# Patient Record
Sex: FEMALE | Race: ASIAN | NOT HISPANIC OR LATINO | Employment: UNEMPLOYED | URBAN - METROPOLITAN AREA
[De-identification: names, ages, dates, MRNs, and addresses within clinical notes are randomized per-mention and may not be internally consistent; named-entity substitution may affect disease eponyms.]

---

## 2017-10-27 ENCOUNTER — TRANSCRIBE ORDERS (OUTPATIENT)
Dept: ADMINISTRATIVE | Facility: HOSPITAL | Age: 52
End: 2017-10-27

## 2017-10-27 DIAGNOSIS — Z12.31 VISIT FOR SCREENING MAMMOGRAM: Primary | ICD-10-CM

## 2017-11-17 ENCOUNTER — ALLSCRIPTS OFFICE VISIT (OUTPATIENT)
Dept: OTHER | Facility: OTHER | Age: 52
End: 2017-11-17

## 2017-11-17 DIAGNOSIS — Z12.31 ENCOUNTER FOR SCREENING MAMMOGRAM FOR MALIGNANT NEOPLASM OF BREAST: ICD-10-CM

## 2017-11-18 NOTE — PROGRESS NOTES
Assessment    1  Encounter for gynecological examination with abnormal finding () (Z01 411)   2  Cervical cancer screening (V76 2) (Z12 4)   3  Colon cancer screening ( 51) (Z12 11)   4  Dietary calcium deficiency (269 3) (E58)   5  Visit for screening mammogram ( 12) (Z12 31)    Plan  Cervical cancer screening    · (Q) THINPREP TIS PAP AND HPV mRNA E6/E7 REFLEX HPV 16,18/45; Status:Active; Requested for:2017;    Perform:[facility]; Order Comments:53 yo   no hx abnl papcvx/endocvx; Due:2018; Ordered; For:Cervical cancer screening; Ordered By:Darby Bansal;  Colon cancer screening    · (Q) FECAL GLOBIN BY IMMUNOCHEMISTRY; Status:Active; Requested for:2017;    Perform:[facility]; Due:2018; Ordered;cancer screening; Ordered By:Maria Del Rosario Bansal;  Encounter for gynecological examination with abnormal finding    · Follow-up visit in 1 year Evaluation and Treatment  Follow-up  Status: Hold For -Scheduling  Requested for: 28YZL0502   Ordered;Encounter for gynecological examination with abnormal finding; Ordered By: Per Paulino Performed:  Due: 83VEF0352   · Begin or continue regular aerobic exercise  Gradually work up to at least {count1}sessions of {dur1} of exercise a week ; Status:Complete;   Done: 65QXG9162   Ordered;For:Encounter for gynecological examination with abnormal finding; Ordered By:Maria Del Rosario Bansal;   · Drink plenty of fluids ; Status:Complete;   Done: 41OQM4899   Ordered;For:Encounter for gynecological examination with abnormal finding; Ordered By:Maria Del Rosario Bansal;   · Eat a low fat and low cholesterol diet ; Status:Complete;   Done: 47IKV9243   Ordered;For:Encounter for gynecological examination with abnormal finding; Ordered By:Maria Del Rosario Bansal;   · Eat a normal well-balanced diet ; Status:Complete;   Done: 07CPS8360   Ordered;For:Encounter for gynecological examination with abnormal finding; Ordered By:Darby Bansal;   · Eat foods that are high in calcium  ; Status:Complete;   Done: 91EYX4092   Ordered;For:Encounter for gynecological examination with abnormal finding; Ordered By:Destinee Bansal;   · How to prevent vaginal infections ; Status:Complete;   Done: 97MJA3380   Ordered;for gynecological examination with abnormal finding; Ordered By:Darby Bansal;   · Many types of infections can be passed person to person  To prevent this you need to beisolated for 7 days ; Status:Complete;   Done: 06NTL8703   Ordered;For:Encounter for gynecological examination with abnormal finding; Ordered By:Destinee Bansal;   · Regular aerobic exercise can help reduce stress ; Status:Complete;   Done: 24CYC5648   Ordered;for gynecological examination with abnormal finding; Ordered By:Destinee Bansal;   · Stretch and warm up your muscles during the first 10 minutes , then cool down yourmuscles for the last 10 minutes of exercise ; Status:Complete;   Done: 94RKE6697   Ordered;for gynecological examination with abnormal finding; Ordered By:Destinee Bansal;   · These are things you can do to prevent falls in and around the home ; Status:Complete;  Done: 35PAM4579   Ordered;for gynecological examination with abnormal finding; Ordered By:Destinee Bansal;   · Use a sun block product with an SPF of 15 or more ; Status:Complete;   Done:17Zfn6723   Ordered;for gynecological examination with abnormal finding; Ordered By:Darby Bansal;   · We encourage all of our patients to exercise regularly  30 minutes of exercise or physicalactivity five or more days a week is recommended for children and adults  ;Status:Complete;   Done: 89RYT2836   Ordered;for gynecological examination with abnormal finding; Ordered By:Destinee Bansal;   · We recommend that you change your eating habits slowly ; Status:Complete;   Done:17Nov2017   Ordered;for gynecological examination with abnormal finding; Ordered By:Destinee Bansal;   · We recommend that you create an advance directive ; Status:Complete; Done:2017   Ordered;for gynecological examination with abnormal finding; Ordered By:Darby Bansal;   · We recommend that you follow these steps to lower your risk of osteoporosis  ;Status:Complete;   Done: 73WZZ7291   Ordered;for gynecological examination with abnormal finding; Ordered By:Darby Bansal;   · We recommend you modify your diet to achieve and maintain a healthy weight  Saundra Shock may increase your risk for developing health problems such as diabetes,heart disease, and cancer  Avoid high fat foods and eat a balanced diet richin fruits and vegetables  The combination of a reduced-calorie diet and increasedphysical activity is recommended  Please let us know if you would like tolearn more about your nutrition and calorie needs, and additional options includingweight loss programs that can help you achieve your goals ; Status:Complete;   Done:2017   Ordered;for gynecological examination with abnormal finding; Ordered By:Kellie Bansal;  Visit for screening mammogram    · MAMMO SCREENING BILATERAL W 3D & CAD; Status:Hold For - Scheduling; Requestedfor:2017;    Perform:[facility]; Order Comments:53 yo  needs screening; Due:2018; Ordered;for screening mammogram; Ordered By:Darby Bansal;    Discussion/Summary  Currently, she eats an adequate diet and has an adequate exercise regimen  Pap test was done today next cervical cancer screening is due Breast cancer screening: mammogram has been ordered  Colorectal cancer screening: fecal occult blood testing supplies were given, the next colonoscopy is due now and never  Osteoporosis screening: bone mineral density testing is not indicated  Advice and education were given regarding aerobic exercise, weight bearing exercise, calcium supplements, reproductive health and contraception  Patient discussion: discussed with the patient  Ca in Vegan diet sheets given  Encouraged colonoscopic screening        History of Present Illness  HPI: 44 yo  c menopausal since 4/15 presents for preventive caresame does feel safe in relationship1-2x/dywalks a lot with work (2H/dy)nonenone      Review of Systems  no pelvic pain,-- no postmenopausal bleeding-- and-- no dysuria  OB History  Pregnancy History (Brief):  Prior pregnancies: : 2  Para: 2 (full-term)-- and-- 2 (living)  Delivery type: 2 vaginal       Active Problems  1  Cervical cancer screening (V76 2) (Z12 4)   2  Colon cancer screening (V76 51) (Z12 11)   3  Dietary calcium deficiency (269 3) (E58)   4  Encounter for gynecological examination with abnormal finding (V72 31) (Z01 411)   5  Hypertension (401 9) (I10)   6  Visit for screening mammogram (V76 12) (Z12 31)    Past Medical History   · History of Chicken pox (052 9) (B01 9)   · History of Dysmenorrhea (625 3) (N94 6)   · Denied: History of abnormal cervical Pap smear   · History of pregnancy (V13 29)    The active problems and past medical history were reviewed and updated today  Surgical History   · History of Neck Surgery    The surgical history was reviewed and updated today  Family History  Mother    · Family history of Hypercholesteremia  Father    · Family history of Parkinson's disease    Social History     · Exercise: Walking   · Housewife or homemaker   ·    · Never a smoker   · No alcohol use   · No drug use   · [facility]  The social history was reviewed and updated today  Current Meds   1  Fish Oil 1200 MG Oral Capsule; Take 1 capsule twice daily; Therapy: 71GKP3435 to Recorded   2  Lisinopril 20 MG Oral Tablet; Therapy: (Recorded:92Pve0198) to Recorded   3  Vitamin D 2000 UNIT Oral Capsule; take 1 capsule daily; Therapy: 58PKU2175 to Recorded    Allergies  1   No Known Drug Allergies    Vitals   Recorded: 44QRI8615 99:54EQ   Systolic 564   Diastolic 60   Height 5 ft    Weight 100 lb 0 6 oz   BMI Calculated 19 54   BSA Calculated 1 39   LMP        Physical Exam Constitutional  General appearance: No acute distress, well appearing and well nourished  Neck  Neck: Normal, supple, trachea midline, no masses  Thyroid: Normal, no thyromegaly  Pulmonary  Respiratory effort: No increased work of breathing or signs of respiratory distress  Genitourinary  External genitalia: Normal and no lesions appreciated  Vagina: Normal, no lesions or dryness appreciated  -- pale pink  Urethral meatus: Normal    Bladder: Normal, soft, non-tender and no prolapse or masses appreciated  Cervix: Normal, no palpable masses  -- parous s lesions, firm, slightly elongated  Uterus: Normal, non-tender, not enlarged, and no palpable masses  -- RF, NSSC, NT,mobile  Adnexa/parametria: Normal, non-tender and no fullness or masses appreciated  -- no masses or tenderness  Anus, perineum, and rectum: Normal sphincter tone, no masses, and no prolapse  No masses or nodularity  Chest  Breasts: Normal and no dimpling or skin changes noted  Abdomen  Abdomen: Normal, non-tender, and no organomegaly noted  Liver and spleen: No hepatomegaly or splenomegaly  Examination for hernias: No hernias appreciated  Lymphatic  Palpation of lymph nodes in neck, axillae, groin and/or other locations: No lymphadenopathy or masses noted  Skin  Skin and subcutaneous tissue: Normal skin turgor and no rashes     Psychiatric  Orientation to person, place, and time: Normal    Mood and affect: Normal        Signatures   Electronically signed by : ASTON Costa ; Nov 17 2017  7:24AM EST                       (Author)

## 2017-11-22 LAB
ADDITIONAL INFORMATION (HISTORICAL): NORMAL
ADEQUACY: (HISTORICAL): NORMAL
COMMENT (HISTORICAL): NORMAL
CYTOTECHNOLOGIST: (HISTORICAL): NORMAL
HPV MRNA E6/E7 (HISTORICAL): NOT DETECTED
INTERPRETATION (HISTORICAL): NORMAL
LMP (HISTORICAL): NORMAL
PREV. BX: (HISTORICAL): NORMAL
PREV. PAP (HISTORICAL): NORMAL
REVIEWED BY (HISTORICAL): NORMAL
SOURCE (HISTORICAL): NORMAL

## 2017-11-24 ENCOUNTER — HOSPITAL ENCOUNTER (OUTPATIENT)
Dept: MAMMOGRAPHY | Facility: HOSPITAL | Age: 52
Discharge: HOME | End: 2017-11-24
Attending: OBSTETRICS & GYNECOLOGY
Payer: COMMERCIAL

## 2017-11-24 ENCOUNTER — HOSPITAL ENCOUNTER (OUTPATIENT)
Dept: MAMMOGRAPHY | Facility: MEDICAL CENTER | Age: 52
Discharge: HOME | End: 2017-11-24
Attending: OBSTETRICS & GYNECOLOGY
Payer: COMMERCIAL

## 2017-11-24 DIAGNOSIS — Z12.31 VISIT FOR SCREENING MAMMOGRAM: ICD-10-CM

## 2017-11-24 PROCEDURE — G0202 SCR MAMMO BI INCL CAD: HCPCS

## 2017-11-24 PROCEDURE — 77063 BREAST TOMOSYNTHESIS BI: CPT

## 2017-11-25 ENCOUNTER — GENERIC CONVERSION - ENCOUNTER (OUTPATIENT)
Dept: OTHER | Facility: OTHER | Age: 52
End: 2017-11-25

## 2017-11-28 ENCOUNTER — GENERIC CONVERSION - ENCOUNTER (OUTPATIENT)
Dept: OTHER | Facility: OTHER | Age: 52
End: 2017-11-28

## 2017-12-21 LAB — FECAL GLOBIN BY IMMUNOCHEM (HISTORICAL): NORMAL

## 2018-01-12 NOTE — RESULT NOTES
Verified Results  (Q) THINPREP TIS PAP AND HPV mRNA E6/E7 REFLEX HPV 16,18/45 [id number] 12:00AM Roro Fallon     Test Name Result Flag Reference   CLINICAL INFORMATION: None given     LMP: NONE GIVEN     PREV  PAP: NONE GIVEN     PREV  BX: NONE GIVEN     SOURCE: Cervix, Endocervix     STATEMENT OF ADEQUACY:      Satisfactory for evaluation  Endocervical/transformation zone component  present  Age and/or menstrual status not provided   INTERPRETATION/RESULT:      Negative for intraepithelial lesion or malignancy  COMMENT:      This Pap test has been evaluated with computer  assisted technology  CYTOTECHNOLOGIST:      [id number] DARBY Fontaine(ASCP)  CT screening location: [address] [place] [address]   HPV mRNA E6/E7 Not Detected  Not Detected   This test was performed using the APTIMA HPV Assay ([employer] )  This assay detects E6/E7 viral messenger RNA (mRNA) from 14  high-risk HPV types (16,18,31,33,35,39,45,51,52,56,58,59,66,68)     REVIEW CYTOTECHNOLOGIST:      ADD, CT(ASCP)  CT screening location: [address] [place] [address]

## 2018-01-13 VITALS
HEIGHT: 60 IN | BODY MASS INDEX: 19.64 KG/M2 | DIASTOLIC BLOOD PRESSURE: 60 MMHG | SYSTOLIC BLOOD PRESSURE: 100 MMHG | WEIGHT: 100.04 LBS

## 2018-01-13 NOTE — RESULT NOTES
Verified Results  (Q) FECAL GLOBIN BY IMMUNOCHEMISTRY [id number] 12:00AM Mariela Alcantar     Test Name Result Flag Reference   FECAL GLOBIN BY$IMMUNOCHEMISTRY      FECAL GLOBIN BY IMMUNOCHEMISTRY         MICRO NUMBER:      [id number]    TEST STATUS:       FINAL    SPECIMEN SOURCE:   STOOL    SPECIMEN QUALITY:  ADEQUATE    RESULT:            Not Detected

## 2018-01-14 NOTE — RESULT NOTES
Verified Results  MAMMO SCREENING BILATERAL W 3D & CAD [id number] 07:42AM Chavo Camacho     Test Name Result Flag Reference   MAMMO SCREENING BILATERAL W 3D & CAD (Report)     Patient History:   Patient is postmenopausal and had first child at age 26  No known family history of cancer  Took hormonal contraceptives for 10 years  Patient has never smoked  Patient's BMI is 18 7  Reason for exam: screening, asymptomatic  Mammo Screening Bilateral W DBT and CAD: November 24, 2017 -    Check In #: [de-identified]   2D/3D Procedure   3D views: Bilateral MLO view(s) were taken  2D views: Bilateral CC and XCCL view(s) were taken  Technologist: Shelly Bernal, RT(R)(M)   Prior study comparison: November 11, 2016, mammo screening    [facility]ilateral W CAD[facility]  November 6, 2015, bilateral AMA dig scrn mamm w/CAD,    performed at [address]  October 24, 2014, bilateral digital screening mammogram performed at [place] [address]  November 5, 2012,    bilateral digital screening mammogram performed at [facility]  April 18, 2011, bilateral    digital screening mammogram performed at [address] [place] [address]  The breast tissue is heterogeneously dense, potentially limiting    the sensitivity of mammography  Patient risk, included in this    report, assists in determining the appropriate screening regimen    (such as 3-D mammography or the inclusion of automated breast    ultrasound or MRI)  3-D mammography may also remain indicated as    screening  A combination of mediolateral oblique 3D tomographic    slices as well as standard two-dimensional orthogonal images were   obtained  No new dominant soft tissue mass, architectural    distortion or suspicious calcifications are noted  The skin and    nipple contours are within normal limits         No evidence of malignancy  No significant changes   when compared with prior studies  ACR BI-RADSï¾® Assessments: BiRad:1 - Negative     Recommendation:   Routine screening mammogram of both breasts in 1 year  The patient is scheduled in a reminder system for screening    mammography  8-10% of cancers will be missed on mammography  Management of a    palpable abnormality must be based on clinical grounds  Patients    will be notified of their results via letter from our facility  Accredited by [employer] and FDA       Transcription Location: [facility] 98: [id number]     Risk Value(s):   Tyrer-Cuzick 10 Year: 2 200%, Tyrer-Cuzick Lifetime: 7 800%,    Myriad Table: 1 5%, MINI 5 Year: 1 6%, NCI Lifetime: 11 4%   Signed by:   Eve Garza MD   11/27/17

## 2018-01-15 NOTE — RESULT NOTES
Verified Results  * MAMMO SCREENING BILATERAL W CAD [id number] 10:24AM Edita Pina Order Number: [id number]   Performing Comments: 45 yo  needs screening   - Patient Instructions: To schedule this appointment, please contact Central Scheduling at [phone]  Do not wear any perfume, powder, lotion or deodorant on breast or underarm area  Please bring your doctors order, referral (if needed) and insurance information with you on the day of the test  Failure to bring this information may result in this test being rescheduled  Arrive 15 minutes prior to your appointment time to register  On the day of your test, please bring any prior mammogram or breast studies with you that were not performed at a [facility]  Failure to bring prior exams may result in your test needing to be rescheduled  Test Name Result Flag Reference   MAMMO SCREENING BILATERAL W CAD (Report)     Patient History:   Patient is postmenopausal and had first child at age 26  No known family history of cancer  Took hormonal contraceptives for 10 years  Patient has never smoked  Patient's BMI is 18 7  Reason for exam: screening (asymptomatic)  Mammo Screening Bilateral W CAD: 2016 - Check In #:    [de-identified]   Bilateral CC and MLO view(s) were taken  Prior study comparison: 2015, bilateral AMA dig scrn    mamm w/CAD performed at [address] [place] [address]  2014, bilateral digital screening mammogram,    performed at [place] [address]  The breast tissue is heterogeneously dense, potentially limiting    the sensitivity of mammography  Patient risk, included in this    report, assists in determining the appropriate screening regimen    (such as 3-D mammography or the inclusion of automated breast    ultrasound or MRI)  3-D mammography may also remain indicated as    screening       No dominant soft tissue mass, architectural distortion or    suspicious calcifications are noted in either breast   The skin    and nipple contours are within normal limits  No evidence of malignancy  No significant changes when compared with prior studies  ASSESSMENT: BiRad:1 - Negative     Recommendation:   Routine screening mammogram of both breasts in 1 year  A    reminder letter will be scheduled  Analyzed by CAD     8-10% of cancers will be missed on mammography  Management of a    palpable abnormality must be based on clinical grounds  Patients   will be notified of their results via letter from our facility  Accredited by [employer] and FDA       Transcription Location: [facility] 98: [id number]     Risk Value(s):   Tyrer-Cuzick 10 Year: 2 182%, Tyrer-Cuzick Lifetime: 8 213%,    Myriad Table: 1 5%, MINI 5 Year: 1 5%, NCI Lifetime: 11 6%   Signed by:   Rachel Aguilar MD   11/11/16

## 2018-10-31 ENCOUNTER — TRANSCRIBE ORDERS (OUTPATIENT)
Dept: ADMINISTRATIVE | Facility: HOSPITAL | Age: 53
End: 2018-10-31

## 2018-11-12 ENCOUNTER — ANNUAL EXAM (OUTPATIENT)
Dept: OBGYN CLINIC | Facility: CLINIC | Age: 53
End: 2018-11-12
Payer: COMMERCIAL

## 2018-11-12 VITALS
WEIGHT: 101.8 LBS | SYSTOLIC BLOOD PRESSURE: 124 MMHG | BODY MASS INDEX: 19.99 KG/M2 | DIASTOLIC BLOOD PRESSURE: 68 MMHG | HEIGHT: 60 IN

## 2018-11-12 DIAGNOSIS — Z01.419 ENCOUNTER FOR GYNECOLOGICAL EXAMINATION WITHOUT ABNORMAL FINDING: Primary | ICD-10-CM

## 2018-11-12 DIAGNOSIS — Z12.31 ENCOUNTER FOR SCREENING MAMMOGRAM FOR BREAST CANCER: ICD-10-CM

## 2018-11-12 DIAGNOSIS — Z12.11 COLON CANCER SCREENING: ICD-10-CM

## 2018-11-12 PROCEDURE — S0612 ANNUAL GYNECOLOGICAL EXAMINA: HCPCS | Performed by: NURSE PRACTITIONER

## 2018-11-12 RX ORDER — ACETAMINOPHEN 160 MG
1 TABLET,DISINTEGRATING ORAL DAILY
COMMUNITY
Start: 2015-11-06

## 2018-11-12 RX ORDER — AMOXICILLIN 500 MG
1 CAPSULE ORAL 2 TIMES DAILY
COMMUNITY
Start: 2015-11-06

## 2018-11-12 RX ORDER — LISINOPRIL 20 MG/1
20 TABLET ORAL DAILY
COMMUNITY

## 2018-11-12 NOTE — PROGRESS NOTES
Assessment / Plan    1  Encounter for gynecological examination without abnormal finding  Normal well woman exam    pap with hpv negative  Plan repeat   Discussed adequate calcium and WBE    2  Colon cancer screening  Has been doing yearly FOB testing-- discussed benefit of colonoscopy and referral was provided  - Ambulatory referral to Gastroenterology; Future    3  Encounter for screening mammogram for breast cancer  Order provided, with 3D since dense breast tissue  - Mammo screening bilateral w 3d & cad; Future      Subjective      Marshell Dakin is a 52 y o  female who presents for her annual gynecologic exam   Patient is accompanied by her  for help with translation  She is doing well, no complaints  2017 pap/hpv negative  2017 FOBI negative  2017 mammo negative, 3-- she would like 3D imaging this year  Current contraception: post menopausal status  History of abnormal Pap smear: no  Family history of breast,uterine, ovarian or colon cancer: no    Menstrual History:  OB History      Para Term  AB Living    2 2            SAB TAB Ectopic Multiple Live Births                      Menarche age: 13  No LMP recorded  Patient is postmenopausal   Period Cycle (Days):  (nayeli at age 44)  Period Duration (Days): 3  Period Pattern: Regular    The following portions of the patient's history were reviewed and updated as appropriate: allergies, current medications, past family history, past medical history, past social history, past surgical history and problem list     Review of Systems      Review of Systems   Constitutional: Negative for chills and fever  Gastrointestinal: Negative for abdominal distention, abdominal pain, blood in stool, constipation, diarrhea, nausea and vomiting  Genitourinary: Negative for difficulty urinating, dysuria, frequency, genital sores, hematuria, menstrual problem, pelvic pain, urgency, vaginal bleeding and vaginal discharge  Breasts:  Negative for skin changes, dimpling, asymmetry, nipple discharge, redness, tenderness or palpable masses    Objective      /68 (BP Location: Left arm, Patient Position: Sitting)   Ht 5' (1 524 m)   Wt 46 2 kg (101 lb 12 8 oz)   Breastfeeding? No   BMI 19 88 kg/m²      Physical Exam   Constitutional: She appears well-developed and well-nourished  No distress  Neck: Neck supple  No thyromegaly present  Pulmonary/Chest: Right breast exhibits no inverted nipple, no mass, no nipple discharge, no skin change and no tenderness  Left breast exhibits no inverted nipple, no mass, no nipple discharge, no skin change and no tenderness  Breasts are symmetrical    Abdominal: Soft  Normal appearance  She exhibits no mass  There is no tenderness  There is no CVA tenderness  Genitourinary: Rectum normal and uterus normal  No labial fusion  There is no rash, tenderness, lesion or injury on the right labia  There is no rash, tenderness, lesion or injury on the left labia  Uterus is not enlarged and not tender  Cervix exhibits no motion tenderness, no discharge and no friability  Right adnexum displays no mass, no tenderness and no fullness  Left adnexum displays no mass, no tenderness and no fullness  No erythema, tenderness or bleeding in the vagina  No foreign body in the vagina  No signs of injury around the vagina  No vaginal discharge found  Lymphadenopathy:     She has no cervical adenopathy  She has no axillary adenopathy  Right: No inguinal and no supraclavicular adenopathy present  Left: No inguinal and no supraclavicular adenopathy present  Neurological: She is alert  She is not disoriented  Skin: Skin is warm, dry and intact  Psychiatric: She has a normal mood and affect   Her behavior is normal

## 2018-12-07 ENCOUNTER — HOSPITAL ENCOUNTER (OUTPATIENT)
Dept: RADIOLOGY | Age: 53
Discharge: HOME | End: 2018-12-07
Payer: COMMERCIAL

## 2018-12-07 VITALS — HEIGHT: 62 IN | BODY MASS INDEX: 18.22 KG/M2 | WEIGHT: 99 LBS

## 2018-12-07 DIAGNOSIS — Z12.31 ENCOUNTER FOR SCREENING MAMMOGRAM FOR BREAST CANCER: ICD-10-CM

## 2018-12-07 PROCEDURE — 77067 SCR MAMMO BI INCL CAD: CPT

## 2018-12-07 PROCEDURE — 77063 BREAST TOMOSYNTHESIS BI: CPT

## 2019-01-08 ENCOUNTER — TELEPHONE (OUTPATIENT)
Dept: GASTROENTEROLOGY | Facility: MEDICAL CENTER | Age: 54
End: 2019-01-08

## 2019-11-11 ENCOUNTER — TRANSCRIBE ORDERS (OUTPATIENT)
Dept: ADMINISTRATIVE | Facility: HOSPITAL | Age: 54
End: 2019-11-11

## 2019-11-11 DIAGNOSIS — Z12.31 ENCOUNTER FOR SCREENING MAMMOGRAM FOR BREAST CANCER: Primary | ICD-10-CM

## 2019-11-18 ENCOUNTER — ANNUAL EXAM (OUTPATIENT)
Dept: OBGYN CLINIC | Facility: CLINIC | Age: 54
End: 2019-11-18
Payer: COMMERCIAL

## 2019-11-18 VITALS
SYSTOLIC BLOOD PRESSURE: 110 MMHG | HEIGHT: 62 IN | WEIGHT: 99.8 LBS | DIASTOLIC BLOOD PRESSURE: 62 MMHG | BODY MASS INDEX: 18.37 KG/M2

## 2019-11-18 DIAGNOSIS — Z12.11 COLON CANCER SCREENING: ICD-10-CM

## 2019-11-18 DIAGNOSIS — Z01.419 ENCOUNTER FOR GYNECOLOGICAL EXAMINATION WITHOUT ABNORMAL FINDING: Primary | ICD-10-CM

## 2019-11-18 DIAGNOSIS — Z12.31 ENCOUNTER FOR SCREENING MAMMOGRAM FOR BREAST CANCER: ICD-10-CM

## 2019-11-18 PROCEDURE — S0612 ANNUAL GYNECOLOGICAL EXAMINA: HCPCS | Performed by: NURSE PRACTITIONER

## 2019-11-18 RX ORDER — GABAPENTIN 100 MG/1
CAPSULE ORAL DAILY
COMMUNITY

## 2019-11-18 NOTE — PROGRESS NOTES
Assessment / Plan    1  Encounter for gynecological examination without abnormal finding  Normal well woman exam   pap/hpv negative  Plan update next year  2  Colon cancer screening  New referral for colonoscopy given  - Ambulatory referral to Gastroenterology; Future    3  Encounter for screening mammogram for breast cancer  Order provided    - Mammo screening bilateral w 3d & cad; Future      Subjective      Severiano Lazier is a 62 y o  female who presents for her annual gynecologic exam     Patient's primary language is Bryce  Was able to translate key ROS questions with Google translate  Last pap: 2017 normal  Last mammogram: 3D 2018 normal  Colonoscopy: FOBI 2017  Was given referral last year but did not have colonoscopy done  Agreeable to having one done this year  Current contraception: post menopausal status  History of abnormal Pap smear: no  Family history of breast,uterine, ovarian or colon cancer: no    Menstrual History:  OB History        2    Para   2    Term   2            AB        Living           SAB        TAB        Ectopic        Multiple        Live Births                    Menarche age: 13; menopause age 44  No LMP recorded  Patient is postmenopausal        The following portions of the patient's history were reviewed and updated as appropriate: allergies, current medications, past family history, past medical history, past social history, past surgical history and problem list     Review of Systems      Review of Systems   Constitutional: Negative for chills and fever  Gastrointestinal: Negative for abdominal distention, abdominal pain, blood in stool, constipation, diarrhea, nausea and vomiting  Genitourinary: Negative for difficulty urinating, dysuria, frequency, genital sores, hematuria, menstrual problem, pelvic pain, urgency, vaginal bleeding and vaginal discharge       Breasts:  Negative for skin changes, dimpling, asymmetry, nipple discharge, redness, tenderness or palpable masses      Objective      /62 (BP Location: Left arm, Patient Position: Sitting, Cuff Size: Standard)   Ht 5' 2" (1 575 m)   Wt 45 3 kg (99 lb 12 8 oz)   BMI 18 25 kg/m²      Physical Exam   Constitutional: She is oriented to person, place, and time  She appears well-developed and well-nourished  No distress  HENT:   Head: Normocephalic and atraumatic  Eyes: Pupils are equal, round, and reactive to light  Neck: Neck supple  No thyromegaly present  Pulmonary/Chest: Effort normal  Right breast exhibits no inverted nipple, no mass, no nipple discharge, no skin change and no tenderness  Left breast exhibits no inverted nipple, no mass, no nipple discharge, no skin change and no tenderness  Breasts are symmetrical    Abdominal: Soft  Normal appearance  She exhibits no mass  There is no tenderness  There is no CVA tenderness  Genitourinary: Rectum normal and uterus normal  Pelvic exam was performed with patient supine  No labial fusion  There is no rash, tenderness, lesion or injury on the right labia  There is no rash, tenderness, lesion or injury on the left labia  Uterus is not enlarged and not tender  Cervix exhibits no motion tenderness, no discharge and no friability  Right adnexum displays no mass, no tenderness and no fullness  Left adnexum displays no mass, no tenderness and no fullness  No erythema, tenderness or bleeding in the vagina  No foreign body in the vagina  No signs of injury around the vagina  No vaginal discharge found  Lymphadenopathy:     She has no cervical adenopathy  She has no axillary adenopathy  Right: No inguinal and no supraclavicular adenopathy present  Left: No inguinal and no supraclavicular adenopathy present  Neurological: She is alert and oriented to person, place, and time  She is not disoriented  Skin: Skin is warm, dry and intact  Psychiatric: She has a normal mood and affect   Her behavior is normal  Thought content normal

## 2020-01-06 ENCOUNTER — HOSPITAL ENCOUNTER (OUTPATIENT)
Dept: RADIOLOGY | Age: 55
Discharge: HOME | End: 2020-01-06
Payer: COMMERCIAL

## 2020-01-06 VITALS — HEIGHT: 62 IN | BODY MASS INDEX: 18.22 KG/M2 | WEIGHT: 99 LBS

## 2020-01-06 DIAGNOSIS — Z12.31 ENCOUNTER FOR SCREENING MAMMOGRAM FOR BREAST CANCER: ICD-10-CM

## 2020-01-06 PROCEDURE — 77063 BREAST TOMOSYNTHESIS BI: CPT

## 2020-01-06 PROCEDURE — 77067 SCR MAMMO BI INCL CAD: CPT

## 2020-12-15 ENCOUNTER — ANNUAL EXAM (OUTPATIENT)
Dept: OBGYN CLINIC | Facility: CLINIC | Age: 55
End: 2020-12-15
Payer: COMMERCIAL

## 2020-12-15 ENCOUNTER — TRANSCRIBE ORDERS (OUTPATIENT)
Dept: ADMINISTRATIVE | Facility: HOSPITAL | Age: 55
End: 2020-12-15

## 2020-12-15 VITALS
BODY MASS INDEX: 19.45 KG/M2 | DIASTOLIC BLOOD PRESSURE: 70 MMHG | HEIGHT: 61 IN | HEART RATE: 101 BPM | SYSTOLIC BLOOD PRESSURE: 118 MMHG | WEIGHT: 103 LBS

## 2020-12-15 DIAGNOSIS — Z01.419 ENCOUNTER FOR GYNECOLOGICAL EXAMINATION WITHOUT ABNORMAL FINDING: Primary | ICD-10-CM

## 2020-12-15 DIAGNOSIS — Z12.11 COLON CANCER SCREENING: ICD-10-CM

## 2020-12-15 DIAGNOSIS — Z11.51 SCREENING FOR HPV (HUMAN PAPILLOMAVIRUS): ICD-10-CM

## 2020-12-15 DIAGNOSIS — Z12.4 ENCOUNTER FOR PAPANICOLAOU SMEAR FOR CERVICAL CANCER SCREENING: ICD-10-CM

## 2020-12-15 DIAGNOSIS — Z12.31 ENCOUNTER FOR SCREENING MAMMOGRAM FOR BREAST CANCER: ICD-10-CM

## 2020-12-15 DIAGNOSIS — Z12.31 ENCOUNTER FOR SCREENING MAMMOGRAM FOR MALIGNANT NEOPLASM OF BREAST: Primary | ICD-10-CM

## 2020-12-15 PROCEDURE — 87624 HPV HI-RISK TYP POOLED RSLT: CPT | Performed by: NURSE PRACTITIONER

## 2020-12-15 PROCEDURE — G0145 SCR C/V CYTO,THINLAYER,RESCR: HCPCS | Performed by: NURSE PRACTITIONER

## 2020-12-15 PROCEDURE — S0612 ANNUAL GYNECOLOGICAL EXAMINA: HCPCS | Performed by: NURSE PRACTITIONER

## 2020-12-16 ENCOUNTER — TELEPHONE (OUTPATIENT)
Dept: GASTROENTEROLOGY | Facility: CLINIC | Age: 55
End: 2020-12-16

## 2020-12-18 LAB
HPV HR 12 DNA CVX QL NAA+PROBE: NEGATIVE
HPV16 DNA CVX QL NAA+PROBE: NEGATIVE
HPV18 DNA CVX QL NAA+PROBE: NEGATIVE

## 2020-12-21 LAB
LAB AP GYN PRIMARY INTERPRETATION: NORMAL
Lab: NORMAL

## 2020-12-29 ENCOUNTER — PREP FOR PROCEDURE (OUTPATIENT)
Dept: GASTROENTEROLOGY | Facility: CLINIC | Age: 55
End: 2020-12-29

## 2020-12-29 DIAGNOSIS — Z12.11 SCREENING FOR COLON CANCER: Primary | ICD-10-CM

## 2020-12-29 DIAGNOSIS — Z12.11 COLON CANCER SCREENING: Primary | ICD-10-CM

## 2021-01-05 ENCOUNTER — ANESTHESIA EVENT (OUTPATIENT)
Dept: GASTROENTEROLOGY | Facility: MEDICAL CENTER | Age: 56
End: 2021-01-05

## 2021-01-11 ENCOUNTER — ANESTHESIA (OUTPATIENT)
Dept: GASTROENTEROLOGY | Facility: MEDICAL CENTER | Age: 56
End: 2021-01-11

## 2021-01-11 ENCOUNTER — HOSPITAL ENCOUNTER (OUTPATIENT)
Dept: GASTROENTEROLOGY | Facility: MEDICAL CENTER | Age: 56
Setting detail: OUTPATIENT SURGERY
Discharge: HOME | End: 2021-01-11
Attending: INTERNAL MEDICINE | Admitting: INTERNAL MEDICINE
Payer: COMMERCIAL

## 2021-01-11 VITALS
TEMPERATURE: 97.8 F | SYSTOLIC BLOOD PRESSURE: 123 MMHG | HEART RATE: 78 BPM | DIASTOLIC BLOOD PRESSURE: 66 MMHG | OXYGEN SATURATION: 100 % | RESPIRATION RATE: 20 BRPM

## 2021-01-11 VITALS — HEART RATE: 78 BPM

## 2021-01-11 DIAGNOSIS — Z12.11 SCREENING FOR COLON CANCER: ICD-10-CM

## 2021-01-11 PROCEDURE — 88305 TISSUE EXAM BY PATHOLOGIST: CPT | Performed by: PATHOLOGY

## 2021-01-11 PROCEDURE — 45385 COLONOSCOPY W/LESION REMOVAL: CPT | Performed by: INTERNAL MEDICINE

## 2021-01-11 RX ORDER — PROPOFOL 10 MG/ML
INJECTION, EMULSION INTRAVENOUS AS NEEDED
Status: DISCONTINUED | OUTPATIENT
Start: 2021-01-11 | End: 2021-01-11

## 2021-01-11 RX ORDER — ONDANSETRON 2 MG/ML
4 INJECTION INTRAMUSCULAR; INTRAVENOUS ONCE AS NEEDED
Status: DISCONTINUED | OUTPATIENT
Start: 2021-01-11 | End: 2021-01-15 | Stop reason: HOSPADM

## 2021-01-11 RX ORDER — SODIUM CHLORIDE 9 MG/ML
125 INJECTION, SOLUTION INTRAVENOUS CONTINUOUS
Status: DISCONTINUED | OUTPATIENT
Start: 2021-01-11 | End: 2021-01-15 | Stop reason: HOSPADM

## 2021-01-11 RX ADMIN — PROPOFOL 50 MG: 10 INJECTION, EMULSION INTRAVENOUS at 09:54

## 2021-01-11 RX ADMIN — PROPOFOL 50 MG: 10 INJECTION, EMULSION INTRAVENOUS at 09:39

## 2021-01-11 RX ADMIN — PROPOFOL 50 MG: 10 INJECTION, EMULSION INTRAVENOUS at 09:36

## 2021-01-11 RX ADMIN — PROPOFOL 50 MG: 10 INJECTION, EMULSION INTRAVENOUS at 09:43

## 2021-01-11 RX ADMIN — PROPOFOL 100 MG: 10 INJECTION, EMULSION INTRAVENOUS at 09:35

## 2021-01-11 RX ADMIN — PROPOFOL 50 MG: 10 INJECTION, EMULSION INTRAVENOUS at 09:46

## 2021-01-11 RX ADMIN — SODIUM CHLORIDE 125 ML/HR: 0.9 INJECTION, SOLUTION INTRAVENOUS at 09:30

## 2021-01-11 RX ADMIN — PROPOFOL 50 MG: 10 INJECTION, EMULSION INTRAVENOUS at 09:59

## 2021-01-11 NOTE — ANESTHESIA POSTPROCEDURE EVALUATION
Post-Op Assessment Note    CV Status:  Stable    Pain management: adequate     Mental Status:  Alert   PONV Controlled:  None   Airway Patency:  Patent      Post Op Vitals Reviewed: Yes      Staff: Anesthesiologist       Blood pressure 123/66, pulse 78, temperature 97 8 °F (36 6 °C), temperature source Temporal, resp  rate 20, SpO2 100 %, not currently breastfeeding  No complications documented      BP      Temp      Pulse     Resp      SpO2

## 2021-01-11 NOTE — H&P
History and Physical - [facility]  Olu Izaguirre 60 y o  female MRN: [id number]                  HPI: Olu Izaguirre is a 60y o  year old female who presents for screening colonoscopy for CRC screening      REVIEW OF SYSTEMS: Per the HPI, and otherwise unremarkable  Historical Information   Past Medical History:   Diagnosis Date    Hypertension      Past Surgical History:   Procedure Laterality Date    NECK SURGERY       Social History   Social History     Substance and Sexual Activity   Alcohol Use No     Social History     Substance and Sexual Activity   Drug Use No     Social History     Tobacco Use   Smoking Status Never Smoker   Smokeless Tobacco Never Used     Family History   Problem Relation Age of Onset    Hyperlipidemia Mother     Hypertension Mother     Parkinsonism Father     Hypertension Father     No Known Problems Sister     No Known Problems Daughter     No Known Problems Maternal Grandmother     No Known Problems Maternal Grandfather     No Known Problems Paternal Grandmother     No Known Problems Paternal Grandfather     No Known Problems Son     No Known Problems Sister     No Known Problems Sister     No Known Problems Sister     No Known Problems Brother     No Known Problems Brother     No Known Problems Brother        Meds/Allergies     (Not in a hospital admission)      No Known Allergies    Objective     not currently breastfeeding  PHYSICAL EXAMINATION:    General Appearance:   Alert, cooperative, no distress   HEENT:  Normocephalic, atraumatic, anicteric  Neck supple, symmetrical, trachea midline  Lungs:   Equal chest rise and unlabored breathing, normal effort, no coughing  Cardiovascular:   No visualized JVD  Abdomen:   No abdominal distension  Skin:   No jaundice, rashes, or lesions  Musculoskeletal:   Normal range of motion visualized  Psych:  Normal affect and normal insight  Neuro:  Alert and appropriate  ASSESSMENT/PLAN:  This is a 60y o  year old female here for colonoscopy, and she is stable and optimized for her procedure

## 2021-01-11 NOTE — DISCHARGE INSTRUCTIONS
Colonoscopy   WHAT YOU NEED TO KNOW:   A colonoscopy is a procedure to examine the inside of your colon (intestine) with a scope  Polyps or tissue growths may have been removed during your colonoscopy  It is normal to feel bloated and to have some abdominal discomfort  You should be passing gas  If you have hemorrhoids or you had polyps removed, you may have a small amount of bleeding  DISCHARGE INSTRUCTIONS:   Seek care immediately if:   · You have a large amount of bright red blood in your bowel movements  · Your abdomen is hard and firm and you have severe pain  · You have sudden trouble breathing  Contact your healthcare provider if:   · You develop a rash or hives  · You have a fever within 24 hours of your procedure       · You have not had a bowel movement for 3 days after your procedure  · You have questions or concerns about your condition or care  Activity:   ·      · Rest after your procedure  You have been given medicine to relax you  Do not  drive or make important decisions until the day after your procedure  Return to your normal activity as directed  · Relieve gas and discomfort from bloating  by lying on your right side with a heating pad on your abdomen  You may need to take short walks to help the gas move out  Eat small meals until bloating is relieved  If you had polyps removed: For 7 days after your procedure:  · Do not  take aspirin  ·    Follow up with your healthcare provider as directed:  Write down your questions so you remember to ask them during your visits  © 2017 [address] is for End User's use only and may not be sold, redistributed or otherwise used for commercial purposes  All illustrations and images included in CareNotes® are the copyrighted property of A D A M , Inc  or Lorne Dupont  The above information is an  only   It is not intended as medical advice for individual conditions or treatments  Talk to your doctor, nurse or pharmacist before following any medical regimen to see if it is safe and effective for you  Colorectal Polyps   WHAT YOU NEED TO KNOW:   Colorectal polyps are small growths of tissue in the lining of the colon and rectum  Most polyps are hyperplastic polyps and are usually benign (noncancerous)  Certain types of polyps, called adenomatous polyps, may turn into cancer  DISCHARGE INSTRUCTIONS:   Follow up with your healthcare provider or gastroenterologist as directed: You may need to return for more tests, such as another colonoscopy  Write down your questions so you remember to ask them during your visits  Reduce your risk for colorectal polyps:   · Eat a variety of healthy foods:  Healthy foods include fruit, vegetables, whole-grain breads, low-fat dairy products, beans, lean meat, and fish  Ask if you need to be on a special diet  · Maintain a healthy weight:  Ask your healthcare provider if you need to lose weight and how much you need to lose  Ask for help with a weight loss program     · Exercise:  Begin to exercise slowly and do more as you get stronger  Talk with your healthcare provider before you start an exercise program      · Limit alcohol:  Your risk for polyps increases the more you drink  · Do not smoke: If you smoke, it is never too late to quit  Ask for information about how to stop  For support and more information:   · Joe Agarwal ([facility]) [address], [place] [address]  Phone: 3- 820 - [phone]  Web Address: www digestive  niddk nih gov    Contact your healthcare provider or gastroenterologist if:   · You have a fever  · You have chills, a cough, or feel weak and achy  · You have abdominal pain that does not go away or gets worse after you take medicine  · Your abdomen is swollen  · You are losing weight without trying      · You have questions or concerns about your condition or care  Seek care immediately or call 911 if:   · You have sudden shortness of breath  · You have a fast heart rate, fast breathing, or are too dizzy to stand up  · You have severe abdominal pain  · You see blood in your bowel movement  © Copyright [address] Information is for End User's use only and may not be sold, redistributed or otherwise used for commercial purposes  All illustrations and images included in CareNotes® are the copyrighted property of A D A M , Inc  or [address] [place], [place] [address]latha Mcgraw   The above information is an  only  It is not intended as medical advice for individual conditions or treatments  Talk to your doctor, nurse or pharmacist before following any medical regimen to see if it is safe and effective for you

## 2021-01-11 NOTE — ANESTHESIA PREPROCEDURE EVALUATION
Procedure:  COLONOSCOPY    Relevant Problems   CARDIO   (+) Hypertension        Physical Exam    Airway    Mallampati score: II  TM Distance: >3 FB  Neck ROM: full     Dental   No notable dental hx     Cardiovascular  Rhythm: regular, Rate: normal,     Pulmonary  Breath sounds clear to auscultation,     Other Findings        Anesthesia Plan  ASA Score- 2     Anesthesia Type- IV sedation with anesthesia with ASA Monitors  Additional Monitors:   Airway Plan:           Plan Factors-Exercise tolerance (METS): >4 METS  Chart reviewed  Patient summary reviewed  Patient is not a current smoker  Patient not instructed to abstain from smoking on day of procedure  Patient did not smoke on day of surgery  Induction- intravenous  Postoperative Plan-     Informed Consent- Anesthetic plan and risks discussed with patient

## 2021-01-21 ENCOUNTER — TELEPHONE (OUTPATIENT)
Dept: GASTROENTEROLOGY | Facility: CLINIC | Age: 56
End: 2021-01-21

## 2021-01-21 NOTE — TELEPHONE ENCOUNTER
----- Message from Tena Hoffmann DO sent at 1/21/2021  1:06 PM EST -----  Patient is primarily [place] speaking but her  does speak [place]  The patient also understands some English as well  Please call patient and let her know that the polyp that was removed at time of colonoscopy is benign  She should have next colonoscopy in 10 years  Thank you  DILLAN Rhoades  Gastroenterology Fellow  [facility]  Available on [email or web address]

## 2021-01-21 NOTE — RESULT ENCOUNTER NOTE
Patient is primarily [place] speaking but her  does speak [place]  The patient also understands some English as well  Please call patient and let her know that the polyp that was removed at time of colonoscopy is benign  She should have next colonoscopy in 10 years  Thank you  DILLAN Brewster  Gastroenterology Fellow  [facility]  Available on [email or web address]

## 2021-01-25 ENCOUNTER — TELEPHONE (OUTPATIENT)
Dept: GASTROENTEROLOGY | Facility: CLINIC | Age: 56
End: 2021-01-25

## 2021-01-25 NOTE — TELEPHONE ENCOUNTER
----- Message from Buck Santamaria DO sent at 1/21/2021  1:06 PM EST -----  Patient is primarily [place] speaking but her  does speak [place]  The patient also understands some English as well  Please call patient and let her know that the polyp that was removed at time of colonoscopy is benign  She should have next colonoscopy in 10 years  Thank you  DILLAN Puente  Gastroenterology Fellow  [facility]  Available on [email or web address]

## 2021-02-19 ENCOUNTER — HOSPITAL ENCOUNTER (OUTPATIENT)
Dept: MAMMOGRAPHY | Facility: CLINIC | Age: 56
Discharge: HOME | End: 2021-02-19
Payer: COMMERCIAL

## 2021-02-19 VITALS — HEIGHT: 61 IN | BODY MASS INDEX: 19.45 KG/M2 | WEIGHT: 103 LBS

## 2021-02-19 DIAGNOSIS — Z12.31 ENCOUNTER FOR SCREENING MAMMOGRAM FOR BREAST CANCER: ICD-10-CM

## 2021-02-19 PROCEDURE — 77067 SCR MAMMO BI INCL CAD: CPT

## 2021-02-19 PROCEDURE — 77063 BREAST TOMOSYNTHESIS BI: CPT

## 2022-01-07 ENCOUNTER — ANNUAL EXAM (OUTPATIENT)
Dept: OBGYN CLINIC | Facility: CLINIC | Age: 57
End: 2022-01-07
Payer: COMMERCIAL

## 2022-01-07 VITALS
BODY MASS INDEX: 18.88 KG/M2 | DIASTOLIC BLOOD PRESSURE: 72 MMHG | SYSTOLIC BLOOD PRESSURE: 130 MMHG | HEIGHT: 61 IN | WEIGHT: 100 LBS

## 2022-01-07 DIAGNOSIS — Z12.31 ENCOUNTER FOR SCREENING MAMMOGRAM FOR BREAST CANCER: ICD-10-CM

## 2022-01-07 DIAGNOSIS — Z01.419 ENCOUNTER FOR GYNECOLOGICAL EXAMINATION WITHOUT ABNORMAL FINDING: Primary | ICD-10-CM

## 2022-01-07 PROCEDURE — S0612 ANNUAL GYNECOLOGICAL EXAMINA: HCPCS | Performed by: NURSE PRACTITIONER

## 2022-01-07 NOTE — PROGRESS NOTES
Assessment / Plan    1  Encounter for gynecological examination without abnormal finding  Normal well woman exam  2020 pap/hpv negative  Repeat   Up to date on colonoscopy  Reassured that she does not have any uterovaginal prolapse  Suggested she avoid repetitive heavy lifting  2  Encounter for screening mammogram for breast cancer  Order provided for update    - Mammo screening bilateral w 3d & cad; Future        Subjective      Miguel Gardiner is a 60 y o  female who presents for her annual gynecologic exam     59 yo  PMF presents for yearly GYN exam     2020 pap/hpv negative  2021 mammo negative  2021 Colonoscopy, polyp, 10 yr repeat    Patient thinks she may have prolapse of uterus  Feels heaviness at times  Her  states that she sometimes lifts 40 lb bottles of water  Denies any associated symptoms such as problems with urination or bowels  Periods are absent  Current contraception: post menopausal status  History of abnormal Pap smear: no  Family history of breast,uterine, ovarian or colon cancer: no    Menstrual History:  OB History        2    Para   2    Term   2            AB        Living           SAB        IAB        Ectopic        Multiple        Live Births               Obstetric Comments   Menarche 13  Menopause 49              No LMP recorded  Patient is postmenopausal        The following portions of the patient's history were reviewed and updated as appropriate: allergies, current medications, past family history, past medical history, past social history, past surgical history and problem list     Review of Systems      Review of Systems   Constitutional: Negative for chills and fever  Respiratory: Negative for cough and shortness of breath  Gastrointestinal: Negative for abdominal distention, abdominal pain, blood in stool, constipation, diarrhea, nausea and vomiting     Genitourinary: Negative for difficulty urinating, dysuria, frequency, genital sores, hematuria, menstrual problem, pelvic pain, urgency, vaginal bleeding and vaginal discharge  Occasional urine leakage with sneeze   Musculoskeletal: Negative for arthralgias and myalgias  Breasts:  Negative for skin changes, dimpling, asymmetry, nipple discharge, redness, tenderness or palpable masses      Objective      /72 (BP Location: Right arm, Patient Position: Sitting, Cuff Size: Adult)   Ht 5' 1" (1 549 m)   Wt 45 4 kg (100 lb)   BMI 18 89 kg/m²   Physical Exam  Constitutional:       General: She is not in acute distress  Appearance: Normal appearance  She is well-developed and normal weight  She is not ill-appearing or diaphoretic  HENT:      Head: Normocephalic and atraumatic  Eyes:      Pupils: Pupils are equal, round, and reactive to light  Neck:      Thyroid: No thyromegaly  Pulmonary:      Effort: Pulmonary effort is normal    Chest:   Breasts: Breasts are symmetrical       Right: No inverted nipple, mass, nipple discharge, skin change, tenderness or supraclavicular adenopathy  Left: No inverted nipple, mass, nipple discharge, skin change, tenderness or supraclavicular adenopathy  Abdominal:      General: There is no distension  Palpations: Abdomen is soft  There is no mass  Tenderness: There is no abdominal tenderness  There is no guarding or rebound  Genitourinary:     General: Normal vulva  Exam position: Lithotomy position  Labia:         Right: No rash, tenderness, lesion or injury  Left: No rash, tenderness, lesion or injury  Vagina: No signs of injury and foreign body  No vaginal discharge, erythema, tenderness or bleeding  Cervix: No cervical motion tenderness, discharge or friability  Uterus: Not enlarged and not tender  Adnexa:         Right: No mass, tenderness or fullness  Left: No mass, tenderness or fullness          Rectum: Normal       Comments: No evidence of prolapse  Musculoskeletal:      Cervical back: Neck supple  Lymphadenopathy:      Cervical: No cervical adenopathy  Upper Body:      Right upper body: No supraclavicular adenopathy  Left upper body: No supraclavicular adenopathy  Skin:     General: Skin is warm and dry  Neurological:      General: No focal deficit present  Mental Status: She is alert and oriented to person, place, and time  She is not disoriented  Psychiatric:         Mood and Affect: Mood normal          Behavior: Behavior normal          Thought Content:  Thought content normal          Judgment: Judgment normal

## 2022-02-21 ENCOUNTER — HOSPITAL ENCOUNTER (OUTPATIENT)
Dept: MAMMOGRAPHY | Facility: MEDICAL CENTER | Age: 57
Discharge: HOME | End: 2022-02-21
Payer: COMMERCIAL

## 2022-02-21 VITALS — HEIGHT: 61 IN | WEIGHT: 100.09 LBS | BODY MASS INDEX: 18.9 KG/M2

## 2022-02-21 DIAGNOSIS — Z12.31 ENCOUNTER FOR SCREENING MAMMOGRAM FOR BREAST CANCER: ICD-10-CM

## 2022-02-21 PROCEDURE — 77067 SCR MAMMO BI INCL CAD: CPT

## 2022-02-21 PROCEDURE — 77063 BREAST TOMOSYNTHESIS BI: CPT

## 2024-03-25 ENCOUNTER — ANNUAL EXAM (OUTPATIENT)
Dept: OBGYN CLINIC | Facility: MEDICAL CENTER | Age: 59
End: 2024-03-25
Payer: COMMERCIAL

## 2024-03-25 VITALS
WEIGHT: 98.2 LBS | HEIGHT: 61 IN | SYSTOLIC BLOOD PRESSURE: 128 MMHG | DIASTOLIC BLOOD PRESSURE: 76 MMHG | BODY MASS INDEX: 18.54 KG/M2

## 2024-03-25 DIAGNOSIS — Z01.419 ENCNTR FOR GYN EXAM (GENERAL) (ROUTINE) W/O ABN FINDINGS: Primary | ICD-10-CM

## 2024-03-25 PROCEDURE — S0612 ANNUAL GYNECOLOGICAL EXAMINA: HCPCS | Performed by: OBSTETRICS & GYNECOLOGY

## 2024-03-25 RX ORDER — ROSUVASTATIN CALCIUM 10 MG/1
TABLET, COATED ORAL
COMMUNITY

## 2024-03-25 NOTE — PROGRESS NOTES
ASSESSMENT & PLAN: Karina CEJA Do was seen today for gynecologic exam.    Diagnoses and all orders for this visit:    Encntr for gyn exam (general) (routine) w/o abn findings        1.  Routine well woman exam done today  2.  Pap and HPV:  The patient's pap is up to date.  Pap and cotesting was not done today.  Current ASCCP Guidelines reviewed.  3.  Mammogram was ordered  4.  Colorectal cancer screening is up to date.    4. The following were reviewed in today's visit: adequate intake of calcium and vitamin D, exercise, and healthy diet.  5. F/u 1 year for next routine GYN exam.      CC:  Annual Gynecologic Examination    HPI: Karina CEJA Do is a 58 y.o.  who presents for annual gynecologic examination.  She has the following concerns:  no gyn c/o.      Health Maintenance:    Patient describes her health as good.  Patient does not have weight concerns.  She exercises with walking.    She does wear her seatbelt routinely.    She does perform regular monthly self breast exams.    She does feel safe at home.   Patients does not follow a special diet.  Patient gets <1 servings of dairy or calcium rich foods a day.    Last pap:  nl pap and neg HPV  Last mammogram: , next scheduled  Last colorectal cancer screenin colonoscopy    Patient Active Problem List   Diagnosis    Hypertension       Past Medical History:   Diagnosis Date    Hyperlipidemia     Hypertension        Past Surgical History:   Procedure Laterality Date    NO PAST SURGERIES         Past OB/Gyn History:    History of abnormal pap smears: No.   Patient is currently sexually active.  heterosexual. Patient's family planning method is post menopausal status.    Family History   Problem Relation Age of Onset    Hyperlipidemia Mother     Hypertension Mother     Parkinsonism Father     Hypertension Father     No Known Problems Sister     No Known Problems Daughter     No Known Problems Maternal Grandmother     No Known Problems Maternal Grandfather      No Known Problems Paternal Grandmother     No Known Problems Paternal Grandfather     No Known Problems Son     No Known Problems Sister     No Known Problems Sister     No Known Problems Sister     No Known Problems Brother     No Known Problems Brother     No Known Problems Brother     Breast cancer Neg Hx     Colon cancer Neg Hx     Ovarian cancer Neg Hx     Uterine cancer Neg Hx        Social History:  Social History     Socioeconomic History    Marital status: /Civil Union     Spouse name: Not on file    Number of children: Not on file    Years of education: Not on file    Highest education level: Not on file   Occupational History    Not on file   Tobacco Use    Smoking status: Never    Smokeless tobacco: Never   Vaping Use    Vaping status: Never Used   Substance and Sexual Activity    Alcohol use: No    Drug use: No    Sexual activity: Yes     Partners: Male     Birth control/protection: None   Other Topics Concern    Not on file   Social History Narrative    Not on file     Social Determinants of Health     Financial Resource Strain: Not on file   Food Insecurity: Not on file   Transportation Needs: Not on file   Physical Activity: Not on file   Stress: Not on file   Social Connections: Not on file   Intimate Partner Violence: Not on file   Housing Stability: Not on file     Presently lives with  and two kids.  Patient is currently employed manicurist.    No Known Allergies      Current Outpatient Medications:     Cholecalciferol (VITAMIN D3) 2000 units capsule, Take 1 capsule by mouth daily, Disp: , Rfl:     gabapentin (NEURONTIN) 100 mg capsule, Daily  , Disp: , Rfl:     lisinopril (ZESTRIL) 20 mg tablet, Take 20 mg by mouth daily, Disp: , Rfl:     Omega-3 Fatty Acids (FISH OIL) 1200 MG CAPS, Take 1 capsule by mouth 2 (two) times a day, Disp: , Rfl:     rosuvastatin (CRESTOR) 10 MG tablet, Take 1 tablet every day by oral route for 90 days., Disp: , Rfl:     Review of Systems  Constitutional  ":no fever, feels well, no tiredness, no recent weight gain or loss  ENT: no ear ache, no loss of hearing, no nosebleeds or nasal discharge, no sore throat or hoarseness.  Cardiovascular: no complaints of slow or fast heart beat, no chest pain, no palpitations, no leg claudication or lower extremity edema.  Respiratory: no complaints of shortness of shortness of breath, no LOPEZ  Breasts:no complaints of breast pain, breast lump, or nipple discharge  Gastrointestinal: no complaints of abdominal pain, constipation, nausea, vomiting, or diarrhea or bloody stools  Genitourinary : no complaints of dysuria, incontinence, pelvic pain, no dysmenorrhea, vaginal discharge or abnormal vaginal bleeding and as noted in HPI.  Musculoskeletal: no complaints of arthralgia, no myalgia, no joint swelling or stiffness, no limb pain or swelling.  Integumentary: no complaints of skin rash or lesion, itching or dry skin  Neurological: no complaints of headache, no confusion, no numbness or tingling, no dizziness or fainting    Physical Exam:     /76   Ht 5' 1\" (1.549 m)   Wt 44.5 kg (98 lb 3.2 oz)   BMI 18.55 kg/m²     General: appears stated age, cooperative, alert normal mood and affect   Psychiatric oriented to person, place and time.  Mood and affect normal   Neck: normal, supple,trachea midline, no masses.  Thyroid: normal, no thyromegaly   Heart: regular rate and rhythm, S1, S2 normal, no murmur, click, rub or gallop   Lungs: clear to auscultation bilaterally, no increased work of breathing or signs of respiratory distress   Breasts: normal, no dimpling or skin changes noted, sm f-c changes outer quad b/l   Abdomen: soft, non-tender, without masses or organomegaly   Vulva: normal , no lesions   Vagina: normal , no lesions, mild atrophy   Urethra: normal   Urethal meatus normal   Bladder Normal, soft, non-tender and no prolapse or masses appreciated   Cervix: normal, no palpable masses    Uterus: normal , non-tender, not " enlarged, no palpable masses   Adnexa: normal, non-tender without fullness or masses   Lymphatic Palpation of lymph nodes in neck, axilla, groin and/or other locations: no lymphadenopathy or masses noted   Skin Normal skin turgor and no rashes.  Palpation of skin and subcutaneous tissue normal.

## 2024-04-03 ENCOUNTER — HOSPITAL ENCOUNTER (OUTPATIENT)
Dept: MAMMOGRAPHY | Facility: MEDICAL CENTER | Age: 59
Discharge: HOME | End: 2024-04-03
Payer: COMMERCIAL

## 2024-04-03 VITALS — WEIGHT: 98.11 LBS | BODY MASS INDEX: 18.52 KG/M2 | HEIGHT: 61 IN

## 2024-04-03 DIAGNOSIS — Z12.31 ENCOUNTER FOR SCREENING MAMMOGRAM FOR MALIGNANT NEOPLASM OF BREAST: ICD-10-CM

## 2024-04-03 PROCEDURE — 77067 SCR MAMMO BI INCL CAD: CPT

## 2024-04-03 PROCEDURE — 77063 BREAST TOMOSYNTHESIS BI: CPT

## 2025-06-04 ENCOUNTER — ANNUAL EXAM (OUTPATIENT)
Dept: OBGYN CLINIC | Facility: MEDICAL CENTER | Age: 60
End: 2025-06-04
Payer: COMMERCIAL

## 2025-06-04 VITALS
WEIGHT: 98.6 LBS | HEIGHT: 61 IN | DIASTOLIC BLOOD PRESSURE: 60 MMHG | SYSTOLIC BLOOD PRESSURE: 110 MMHG | BODY MASS INDEX: 18.61 KG/M2

## 2025-06-04 DIAGNOSIS — Z01.419 ENCOUNTER FOR ROUTINE GYNECOLOGICAL EXAMINATION WITH PAPANICOLAOU SMEAR OF CERVIX: Primary | ICD-10-CM

## 2025-06-04 PROCEDURE — G0476 HPV COMBO ASSAY CA SCREEN: HCPCS | Performed by: OBSTETRICS & GYNECOLOGY

## 2025-06-04 PROCEDURE — G0145 SCR C/V CYTO,THINLAYER,RESCR: HCPCS | Performed by: OBSTETRICS & GYNECOLOGY

## 2025-06-04 PROCEDURE — S0612 ANNUAL GYNECOLOGICAL EXAMINA: HCPCS | Performed by: OBSTETRICS & GYNECOLOGY

## 2025-06-04 NOTE — PROGRESS NOTES
ASSESSMENT & PLAN: ELBERT was seen today for gynecologic exam.    Diagnoses and all orders for this visit:    Encounter for routine gynecological examination with Papanicolaou smear of cervix  -     Liquid-based pap, screening        1.  Routine well woman exam done today  2.  Pap and HPV:  The patient's pap is not up to date.  Pap and cotesting was done today.  Current ASCCP Guidelines reviewed.  3.  Mammogram has been scheduled.  4.  Colorectal cancer screening is up to date.    4. The following were reviewed in today's visit: adequate intake of calcium and vitamin D, exercise, and healthy diet.  5. F/u 1 year for next routine GYN exam.    CC:  Annual Gynecologic Examination    HPI: Elbert CEJA Do is a 59 y.o.  who presents for annual gynecologic examination.  She has the following concerns:  no c/o.    Health Maintenance:    Patient describes her health as good.  Patient does not have weight concerns.  She exercises 7 days per week with walking.    She does wear her seatbelt routinely.    She does perform regular monthly self breast exams.    She does feel safe at home.   Patients does not follow a visual diet.  Patient gets <1 servings of dairy or calcium rich foods a day.    Last pap:  nl pap and neg HPV   Last mammogram: , scheduled  Last colorectal cancer screenin colonoscopy    Problem List[1]    Past Medical History[2]    Past Surgical History[3]    Past OB/Gyn History:    History of abnormal pap smears: No.   Patient is currently sexually active.  heterosexual. Patient's family planning method is post menopausal status.    Family History[4]    Social History:  Social History     Socioeconomic History    Marital status: /Civil Union     Spouse name: Not on file    Number of children: Not on file    Years of education: Not on file    Highest education level: Not on file   Occupational History    Not on file   Tobacco Use    Smoking status: Never    Smokeless tobacco: Never   Vaping Use     "Vaping status: Never Used   Substance and Sexual Activity    Alcohol use: No    Drug use: No    Sexual activity: Yes     Partners: Male     Birth control/protection: None   Other Topics Concern    Not on file   Social History Narrative    Not on file     Social Drivers of Health     Financial Resource Strain: Not on file   Food Insecurity: Not on file   Transportation Needs: Not on file   Physical Activity: Not on file   Stress: Not on file   Social Connections: Not on file   Intimate Partner Violence: Not on file   Housing Stability: Not on file     Presently lives with  and 2 kids.  1 kid is at [employer] and the other is working..  Patient is currently employed manicurist.    Allergies[5]    Current Medications[6]    Review of Systems  Constitutional :no fever, feels well, no tiredness, no recent weight gain or loss  ENT: no ear ache, no loss of hearing, no nosebleeds or nasal discharge, no sore throat or hoarseness.  Cardiovascular: no complaints of slow or fast heart beat, no chest pain, no palpitations, no leg claudication or lower extremity edema.  Respiratory: no complaints of shortness of shortness of breath, no LOPEZ  Breasts:no complaints of breast pain, breast lump, or nipple discharge  Gastrointestinal: no complaints of abdominal pain, constipation, nausea, vomiting, or diarrhea or bloody stools  Genitourinary : no complaints of dysuria, incontinence, pelvic pain, no dysmenorrhea, vaginal discharge or abnormal vaginal bleeding and as noted in HPI.  Musculoskeletal: no complaints of arthralgia, no myalgia, no joint swelling or stiffness, no limb pain or swelling.  Integumentary: no complaints of skin rash or lesion, itching or dry skin  Neurological: no complaints of headache, no confusion, no numbness or tingling, no dizziness or fainting    Physical Exam:     /60   Ht 5' 1\" (1.549 m)   Wt 44.7 kg (98 lb 9.6 oz)   BMI 18.63 kg/m²     General: appears stated age, cooperative, alert normal mood " and affect   Psychiatric oriented to person, place and time.  Mood and affect normal   Neck: normal, supple,trachea midline, no masses.  Thyroid: normal, no thyromegaly   Heart: regular rate and rhythm, S1, S2 normal, no murmur, click, rub or gallop   Lungs: clear to auscultation bilaterally, no increased work of breathing or signs of respiratory distress   Breasts: normal, no dimpling or skin changes noted, sm f-c changes outer quad b/l    Abdomen: soft, non-tender, without masses or organomegaly   Vulva: normal , no lesions   Vagina: normal , no lesions, mild atrophy   Urethra: normal   Urethal meatus normal   Bladder Normal, soft, non-tender and no prolapse or masses appreciated   Cervix: normal, no palpable masses    Uterus: normal , non-tender, not enlarged, no palpable masses   Adnexa: normal, non-tender without fullness or masses   Lymphatic Palpation of lymph nodes in neck, axilla, groin and/or other locations: no lymphadenopathy or masses noted   Skin Normal skin turgor and no rashes.  Palpation of skin and subcutaneous tissue normal.             [1]   Patient Active Problem List  Diagnosis    Hypertension   [2]   Past Medical History:  Diagnosis Date    Hyperlipidemia     Hypertension    [3]   Past Surgical History:  Procedure Laterality Date    NO PAST SURGERIES     [4]   Family History  Problem Relation Name Age of Onset    Hyperlipidemia Mother      Hypertension Mother      Parkinsonism Father      Hypertension Father      No Known Problems Sister      No Known Problems Daughter      No Known Problems Maternal Grandmother      No Known Problems Maternal Grandfather      No Known Problems Paternal Grandmother      No Known Problems Paternal Grandfather      No Known Problems Son      No Known Problems Sister      No Known Problems Sister      No Known Problems Sister      No Known Problems Brother      No Known Problems Brother      No Known Problems Brother      Breast cancer Neg Hx      Colon cancer  Neg Hx      Ovarian cancer Neg Hx      Uterine cancer Neg Hx     [5] No Known Allergies  [6]   Current Outpatient Medications:     Cholecalciferol (VITAMIN D3) 2000 units capsule, Take 1 capsule by mouth in the morning., Disp: , Rfl:     gabapentin (NEURONTIN) 100 mg capsule, in the morning., Disp: , Rfl:     lisinopril (ZESTRIL) 20 mg tablet, Take 20 mg by mouth in the morning., Disp: , Rfl:     Omega-3 Fatty Acids (FISH OIL) 1200 MG CAPS, Take 1 capsule by mouth in the morning and 1 capsule in the evening., Disp: , Rfl:

## 2025-06-09 ENCOUNTER — HOSPITAL ENCOUNTER (OUTPATIENT)
Dept: MAMMOGRAPHY | Facility: MEDICAL CENTER | Age: 60
Discharge: HOME | End: 2025-06-09
Attending: OBSTETRICS & GYNECOLOGY
Payer: COMMERCIAL

## 2025-06-09 VITALS — WEIGHT: 98 LBS | BODY MASS INDEX: 18.5 KG/M2 | HEIGHT: 61 IN

## 2025-06-09 DIAGNOSIS — Z12.31 ENCOUNTER FOR SCREENING MAMMOGRAM FOR MALIGNANT NEOPLASM OF BREAST: ICD-10-CM

## 2025-06-09 PROCEDURE — 77067 SCR MAMMO BI INCL CAD: CPT

## 2025-06-09 PROCEDURE — 77063 BREAST TOMOSYNTHESIS BI: CPT

## 2025-06-10 LAB
LAB AP GYN PRIMARY INTERPRETATION: NORMAL
Lab: NORMAL

## 2025-06-11 ENCOUNTER — RESULTS FOLLOW-UP (OUTPATIENT)
Dept: OBGYN CLINIC | Facility: MEDICAL CENTER | Age: 60
End: 2025-06-11

## 2025-06-16 ENCOUNTER — RESULTS FOLLOW-UP (OUTPATIENT)
Dept: OBGYN CLINIC | Facility: MEDICAL CENTER | Age: 60
End: 2025-06-16